# Patient Record
Sex: MALE | Race: WHITE | Employment: FULL TIME | URBAN - METROPOLITAN AREA
[De-identification: names, ages, dates, MRNs, and addresses within clinical notes are randomized per-mention and may not be internally consistent; named-entity substitution may affect disease eponyms.]

---

## 2018-07-11 ENCOUNTER — HOSPITAL ENCOUNTER (OUTPATIENT)
Dept: PHYSICAL THERAPY | Age: 63
Discharge: HOME | End: 2018-07-11
Payer: OTHER MISCELLANEOUS

## 2018-07-11 PROCEDURE — 97140 MANUAL THERAPY 1/> REGIONS: CPT

## 2018-07-11 PROCEDURE — 97110 THERAPEUTIC EXERCISES: CPT

## 2018-07-11 PROCEDURE — 97161 PT EVAL LOW COMPLEX 20 MIN: CPT

## 2018-07-11 NOTE — PROGRESS NOTES
PHYSICAL THERAPY - DAILY TREATMENT NOTE      Patient Name: Dane Blandon        Date: 2018  : 1955   YES Patient  Verified  Visit #:   1   of   5  Insurance: Payor: Erin Weathers / Plan: Eliel Ward / Product Type: Commerical /      In time: 3:00 Out time: 3:45   Total Treatment Time: 45     Medicare Time Tracking (below)   Total Timed Codes (min):  n/a 1:1 Treatment Time:  n/a     TREATMENT AREA = Lower back pain [M54.5]     SUBJECTIVE    Pain Level (on 0 to 10 scale):  1  / 10   Medication Changes/New allergies or changes in medical history, any new surgeries or procedures? NO    If yes, update Summary List   Subjective Functional Status/Changes:  []  No changes reported       See Eval       OBJECTIVE    10 min Therapeutic Exercise:  [x]  See flow sheet   Rationale:      increase ROM and increase strength to improve the patients ability to lift     10 min Manual Therapy: Technique:      [] S/DTM []IASTM []PROM [] Passive Stretching   []manual TPR    []Jt manipulation:Gr I [] II []  III [] IV[] V[]  Treatment Area:  S/L DTM left QL, lumbar paraspinals; MET correct left rotation L5   Rationale:      decrease pain, increase ROM and increase tissue extensibility to improve patient's ability to lift     min Patient Education:  YES  Reviewed HEP   []  Progressed/Changed HEP based on:         Other Objective/Functional Measures:    See Eval     Post Treatment Pain Level (on 0 to 10) scale:   1  / 10     ASSESSMENT    Assessment/Changes in Function:     See Eval     []  See Progress Note/Recertification   Patient will continue to benefit from skilled PT services to modify and progress therapeutic interventions, address functional mobility deficits, address ROM deficits, address strength deficits, analyze and address soft tissue restrictions, analyze and cue movement patterns, analyze and modify body mechanics/ergonomics and assess and modify postural abnormalities to attain remaining goals. to attain remaining goals.    Progress toward goals / Updated goals:    See Eval     PLAN    [x]  Upgrade activities as tolerated YES Continue plan of care   []  Discharge due to :    []  Other:      Therapist: Kimberlee Flanagan PT    Date: 7/11/2018 Time: 5:23 PM   Future Appointments  Date Time Provider Viji Melgar   7/16/2018 3:00 PM Kimberlee Flanagan PT [facility]   7/23/2018 3:00 PM Kimberlee Flanagan, PT [facility]   7/30/2018 3:00 PM Kimberlee Flanagan, PT [facility] AT [facility]

## 2018-07-11 NOTE — PROGRESS NOTES
[address]   PHYSICAL THERAPY AT [facility] 95 [facility], [address] [place] [address], [address] [facility], [address] [place] [address] Ln - Phone: [phone]  Fax: [phone] / [phone] [facility]  Patient Name: Jamal Mullins : 1955   Medical   Diagnosis: Lower back pain [M54.5] Treatment Diagnosis: Mechanical LBP   Onset Date: 18     Referral Source: Milton Mark MD Start of Care [facility]): 2018   Prior Hospitalization: See medical history Provider #: [id number]   Prior Level of Function: No hx LBP   Comorbidities: Epilepsy, hx CA, Hepatitis, OA, thyroid issed   Medications: Verified on Patient Summary List   The Plan of Care and following information is based on the information from the initial evaluation.   ===========================================================================================  Assessment / key information:  Jamal Mullins is a 60 y.o.  yo male with Dx of Lower back pain [M54.5]. 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 Patient currently rates pain as 10/10 at worst, 1/10 at best, primarily located at left>right lumbar spine. Currently on,ight duty. Patient complains of difficulty and increase pain with lifting. Objective Findings:  Lumbar ROM: Flx = decreased 25% , Ext = decreased 50%, Lat Flx R = decreased 25%, L = decreased 25%, Rot: R = decreased 25%, L = decreased 25%. Manual Muscle Testin/5 bilateral hip extension. Special Test: TTP left lumbar paraspinals, left rotation L%, bilateral hamstring tightness and decreased thoracic mobility all planes. FOTO Score= 69 points points.   Pt instructed in HEP and will f/u in clinic for PT.  ===========================================================================================  Eval Complexity: History HIGH Complexity :3+ comorbidities / personal factors will impact the outcome/ POC ;  Examination  HIGH Complexity : 4+ Standardized tests and measures addressing body structure, function, activity limitation and / or participation in recreation ; Presentation LOW Complexity : Stable, uncomplicated ;  Decision Making MEDIUM Complexity : FOTO score of 26-74; Overall Complexity LOW   Problem List: pain affecting function, decrease ROM, decrease strength, edema affecting function, impaired gait/ balance, decrease ADL/ functional abilitiies and decrease activity tolerance   Treatment Plan may include any combination of the following: Therapeutic exercise, Therapeutic activities, Neuromuscular re-education, Physical agent/modality, Manual therapy and Patient education  Patient / Family readiness to learn indicated by: asking questions, trying to perform skills and interest  Persons(s) to be included in education: patient (P)  Barriers to Learning/Limitations: None  Measures taken:    Patient Goal (s): \"Exercise to strengthen muscles. \"   Patient self reported health status: good  Rehabilitation Potential: good   Short Term Goals: To be accomplished in  2  weeks:  1. Patient will increase FOTO Score to 70 points to improve activity tolerance. 2. Patient will achieve +2 on GROC to increase activity tolerance.  Long Term Goals: To be accomplished in  4  weeks:  1. Patient will achieve +4 on GROC to increase activity tolerance. 2.  Patient will increase FOTO Score to 71 to improve activity tolerance. 3.  Patient will report pain free lumbar extension AROM to improve tolerance to lifting at work. Frequency / Duration:   Patient to be seen  2-3  times per week for 4  weeks:  Patient / Caregiver education and instruction: exercises  G-Sumi (GP): n/a  Therapist Signature: LANE Gurrola Date: 9/60/0019   Certification Period: n/a Time: 5:25 PM   ===========================================================================================  I certify that the above Physical Therapy Services are being furnished while the patient is under my care.   I agree with the treatment plan and certify that this therapy is necessary. Physician Signature:        Date:       Time:     Please sign and return to [facility] or you may fax the signed copy to [phone]. Thank you.

## 2018-07-16 ENCOUNTER — HOSPITAL ENCOUNTER (OUTPATIENT)
Dept: PHYSICAL THERAPY | Age: 63
Discharge: HOME | End: 2018-07-16
Payer: OTHER MISCELLANEOUS

## 2018-07-16 PROCEDURE — 97110 THERAPEUTIC EXERCISES: CPT

## 2018-07-16 PROCEDURE — 97140 MANUAL THERAPY 1/> REGIONS: CPT

## 2018-07-16 NOTE — PROGRESS NOTES
PHYSICAL THERAPY - DAILY TREATMENT NOTE      Patient Name: Kerri Jeffrey        Date: 2018  : 1955   YES Patient  Verified  Visit #:   2   of   5  Insurance: Payor: Merced Dumont / Plan: Emili Pencil / Product Type: Commerical /      In time: 3:00 Out time: 3:45   Total Treatment Time: 45     Medicare Time Tracking (below)   Total Timed Codes (min):  n/a 1:1 Treatment Time:  n/a     TREATMENT AREA =  Lower back pain [M54.5]    SUBJECTIVE    Pain Level (on 0 to 10 scale):  0  / 10   Medication Changes/New allergies or changes in medical history, any new surgeries or procedures? NO    If yes, update Summary List   Subjective Functional Status/Changes:  []  No changes reported       Functional improvements: surfing  Functional impairments: lifting         OBJECTIVE    35 min Therapeutic Exercise:  [x]  See flow sheet   Rationale:      increase ROM and increase strength to improve the patients ability to return to full duty work     10 min Manual Therapy: Technique:      [] S/DTM []IASTM []PROM [] Passive Stretching   []manual TPR    []Jt manipulation:Gr I [] II []  III [] IV[] V[]  Treatment Area:  S/L DTM left QL, lumbar paraspinals. Rationale:      decrease pain, increase ROM and increase tissue extensibility to improve patient's ability to return to work full duty     min Patient Education:  Carlito Duarte   []  Progressed/Changed HEP based on: Other Objective/Functional Measures:    Patient able to demonstrate good lifting mechanics with verbal cuing. Post Treatment Pain Level (on 0 to 10) scale:   0  / 10     ASSESSMENT    Assessment/Changes in Function:     Patient able to surf this weekend without increased back pain.      []  See Progress Note/Recertification   Patient will continue to benefit from skilled PT services to modify and progress therapeutic interventions, address functional mobility deficits, address ROM deficits, address strength deficits, analyze and address soft tissue restrictions, analyze and cue movement patterns, analyze and modify body mechanics/ergonomics and assess and modify postural abnormalities to attain remaining goals. to attain remaining goals. Progress toward goals / Updated goals:    Progressing toward pain goals.      PLAN    [x]  Upgrade activities as tolerated YES Continue plan of care   []  Discharge due to :    []  Other:      Therapist: Devin Daniel PT    Date: 7/16/2018 Time: 3:17 PM   Future Appointments  Date Time Provider Viji Melgar   7/23/2018 3:00 PM Devin Daniel PT [facility]   7/30/2018 3:00 PM Devin Daniel PT [facility]

## 2018-07-23 ENCOUNTER — HOSPITAL ENCOUNTER (OUTPATIENT)
Dept: PHYSICAL THERAPY | Age: 63
Discharge: HOME | End: 2018-07-23
Payer: OTHER MISCELLANEOUS

## 2018-07-23 PROCEDURE — 97140 MANUAL THERAPY 1/> REGIONS: CPT

## 2018-07-23 PROCEDURE — 97110 THERAPEUTIC EXERCISES: CPT

## 2018-07-23 NOTE — PROGRESS NOTES
PHYSICAL THERAPY - DAILY TREATMENT NOTE      Patient Name: Héctor Martinez        Date: 2018  : 1955   YES Patient  Verified  Visit #:   3   of   5  Insurance: Payor: Woo Loo / Plan: Mar Aus / Product Type: Commerical /      In time: 3:00 Out time: 3:40   Total Treatment Time: 40     Medicare Time Tracking (below)   Total Timed Codes (min):  n/a 1:1 Treatment Time:  n/a     TREATMENT AREA =  Lower back pain [M54.5]    SUBJECTIVE    Pain Level (on 0 to 10 scale):  4  / 10   Medication Changes/New allergies or changes in medical history, any new surgeries or procedures?     NO    If yes, update Summary List   Subjective Functional Status/Changes:  []  No changes reported       Functional improvements: none reported  Functional impairments: ADLs/work         OBJECTIVE  Modalities Rationale:     decrease pain to improve patient's ability to perform ADLs/work      min [] Estim, type/location:                                      []  att     []  unatt     []  w/US     []  w/ice    []  w/heat    min []  Mechanical Traction: type/lbs                   []  pro   []  sup   []  int   []  cont    []  before manual    []  after manual    min []  Ultrasound, settings/location:      min []  Iontophoresis w/ dexamethasone, location:                                               []  take home patch       []  in clinic   10 min []  Ice     [x]  Heat    location/position: Supine lumbar    min []  Vasopneumatic Device, press/temp:     min []  Other:    [x] Skin assessment post-treatment (if applicable):    [x]  intact    []  redness- no adverse reaction     []redness  adverse reaction:      20 min Therapeutic Exercise:  [x]  See flow sheet   Rationale:      increase ROM and increase strength to improve the patients ability to perform ADLs/work     10 min Manual Therapy: Technique:      [] S/DTM []IASTM []PROM [] Passive Stretching   []manual TPR    []Jt manipulation:Gr I [] II []  III [] IV[] V[]  Treatment Area: S/L DTM left lumbar paraspinals     Rationale:      decrease pain and increase tissue extensibility to improve patient's ability to perform ADLs/work     min Patient Education:  YES  Reviewed HEP   []  Progressed/Changed HEP based on: Other Objective/Functional Measures:    Patient reported slip and fall in mud at work. No significant complaints of injury, left lumbar pain 4/10. Post Treatment Pain Level (on 0 to 10) scale:   1 / 10     ASSESSMENT    Assessment/Changes in Function:     Patient progressing well prior to fall; neutral pelvic and lumbar alignment noted. WIll monitor next visit. []  See Progress Note/Recertification   Patient will continue to benefit from skilled PT services to modify and progress therapeutic interventions, address functional mobility deficits, address ROM deficits, address strength deficits, analyze and address soft tissue restrictions, analyze and cue movement patterns, analyze and modify body mechanics/ergonomics and assess and modify postural abnormalities to attain remaining goals. to attain remaining goals. Progress toward goals / Updated goals:    Progress limited by fall.      PLAN    [x]  Upgrade activities as tolerated YES Continue plan of care   []  Discharge due to :    []  Other:      Therapist: Katalina Frankel PT    Date: 7/23/2018 Time: 3:26 PM   Future Appointments  Date Time Provider Viji Melgar   7/30/2018 3:00 PM Katalina Frankel PT [facility]

## 2018-07-30 ENCOUNTER — HOSPITAL ENCOUNTER (OUTPATIENT)
Dept: PHYSICAL THERAPY | Age: 63
Discharge: HOME | End: 2018-07-30
Payer: OTHER MISCELLANEOUS

## 2018-07-30 PROCEDURE — 97110 THERAPEUTIC EXERCISES: CPT

## 2018-07-30 PROCEDURE — 97140 MANUAL THERAPY 1/> REGIONS: CPT

## 2018-07-30 NOTE — PROGRESS NOTES
PHYSICAL THERAPY - DAILY TREATMENT NOTE   Patient Name: Nati Bronson        Date: 2018 : 1955   YES Patient  Verified Visit #:   4   of   5  Insurance: Payor: Braulio Ha / Plan: Boris Gaxiola / Product Type: Commerical / In time: 3:00 Out time: 3:40 Total Treatment Time: 40 Medicare Time Tracking (below) Total Timed Codes (min):  n/a 1:1 Treatment Time:  n/a  
 
TREATMENT AREA =  Lower back pain [M54.5] SUBJECTIVE Pain Level (on 0 to 10 scale):  1  / 10 Medication Changes/New allergies or changes in medical history, any new surgeries or procedures? NO    If yes, update Summary List  
Subjective Functional Status/Changes:  []  No changes reported Functional improvements: bending Functional impairments: lifting OBJECTIVE 30 min Therapeutic Exercise:  [x]  See flow sheet Rationale:      increase ROM and increase strength to improve the patients ability to lift 10 min Manual Therapy: Technique:     
[] S/DTM []IASTM []PROM [] Passive Stretching  
[]manual TPR []Jt manipulation:Gr I [] II []  III [] IV[] V[] Treatment Area:  S/L DTM left lumbar paraspinals,QL Rationale:      decrease pain, increase ROM and increase tissue extensibility to improve patient's ability to lift 
 
 
 min Patient Education:  YES  Reviewed HEP []  Progressed/Changed HEP based on: Other Objective/Functional Measures: 
 
Improved tolerance to bending at work. No residual pain or dysfunction after fall at work on 18. Post Treatment Pain Level (on 0 to 10) scale:   0  / 10 ASSESSMENT Assessment/Changes in Function:  
 
Continued complaints of lumbar \"tightness\" with lifting. []  See Progress Note/Recertification Patient will continue to benefit from skilled PT services to modify and progress therapeutic interventions, address functional mobility deficits, address ROM deficits, address strength deficits, analyze and address soft tissue restrictions, analyze and cue movement patterns, analyze and modify body mechanics/ergonomics and assess and modify postural abnormalities to attain remaining goals. to attain remaining goals. Progress toward goals / Updated goals: 
 
Progressing toward pain goals. Plan to D/C next visit. PLAN [x]  Upgrade activities as tolerated YES Continue plan of care  
[]  Discharge due to :   
[]  Other:   
 
Therapist: Nova Tijerina PT Date: 7/30/2018 Time: 3:16 PM  
No future appointments.

## 2018-10-01 NOTE — PROGRESS NOTES
Feli Lux [address] [facility] PHYSICAL THERAPY AT [facility] 95 [facility], [address] [place] [address], [address] [place] [address]  Phone: [phone]  Fax: [phone] DISCHARGE SUMMARY Patient Name: Verneda Snellen : 1955 Treatment/Medical Diagnosis: Lower back pain [M54.5] Referral Source: Allen Barnes MD    
Date of Initial Visit: 18 Attended Visits: 4 Missed Visits: 0  
 
SUMMARY OF TREATMENT Treatment focused on manual therapy and theres to improve LBP. CURRENT STATUS Patient attended 4 sessions and did not return to PT. Patient reported improvement throughout therapy, however did not complete course of treatment. Unable to assess status at discharge due to lack of attendance. RECOMMENDATIONS Discontinue therapy due to lack of attendance or compliance. If you have any questions/comments please contact us directly at [phone]. Thank you for allowing us to assist in the care of your patient. Therapist Signature: Petra Peres PT Date: 10/1/18   Time: 11:36 AM